# Patient Record
Sex: MALE | Race: WHITE | Employment: PART TIME | URBAN - METROPOLITAN AREA
[De-identification: names, ages, dates, MRNs, and addresses within clinical notes are randomized per-mention and may not be internally consistent; named-entity substitution may affect disease eponyms.]

---

## 2020-03-26 ENCOUNTER — HOSPITAL ENCOUNTER (INPATIENT)
Age: 55
LOS: 6 days | Discharge: HOME | DRG: 881 | End: 2020-04-01
Attending: EMERGENCY MEDICINE | Admitting: PSYCHIATRY & NEUROLOGY
Payer: OTHER GOVERNMENT

## 2020-03-26 PROBLEM — F32.A DEPRESSION WITH SUICIDAL IDEATION: Status: ACTIVE | Noted: 2020-03-26

## 2020-03-26 PROBLEM — R45.851 DEPRESSION WITH SUICIDAL IDEATION: Status: ACTIVE | Noted: 2020-03-26

## 2020-03-26 LAB
ACETAMINOPHEN LEVEL: <5 MCG/ML (ref 10–30)
ALBUMIN SERPL-MCNC: 4.3 G/DL (ref 3.5–5.2)
ALP BLD-CCNC: 109 U/L (ref 40–129)
ALT SERPL-CCNC: 28 U/L (ref 0–40)
AMPHETAMINE SCREEN, URINE: POSITIVE
ANION GAP SERPL CALCULATED.3IONS-SCNC: 21 MMOL/L (ref 7–16)
AST SERPL-CCNC: 37 U/L (ref 0–39)
BARBITURATE SCREEN URINE: POSITIVE
BASOPHILS ABSOLUTE: 0.07 E9/L (ref 0–0.2)
BASOPHILS RELATIVE PERCENT: 1.1 % (ref 0–2)
BENZODIAZEPINE SCREEN, URINE: NOT DETECTED
BILIRUB SERPL-MCNC: 0.5 MG/DL (ref 0–1.2)
BUN BLDV-MCNC: 8 MG/DL (ref 6–20)
CALCIUM SERPL-MCNC: 8.6 MG/DL (ref 8.6–10.2)
CANNABINOID SCREEN URINE: NOT DETECTED
CHLORIDE BLD-SCNC: 101 MMOL/L (ref 98–107)
CO2: 20 MMOL/L (ref 22–29)
COCAINE METABOLITE SCREEN URINE: NOT DETECTED
CREAT SERPL-MCNC: 0.8 MG/DL (ref 0.7–1.2)
EOSINOPHILS ABSOLUTE: 0.15 E9/L (ref 0.05–0.5)
EOSINOPHILS RELATIVE PERCENT: 2.3 % (ref 0–6)
ETHANOL: 205 MG/DL (ref 0–0.08)
ETHANOL: 57 MG/DL (ref 0–0.08)
ETHANOL: <10 MG/DL (ref 0–0.08)
FENTANYL SCREEN, URINE: NOT DETECTED
GFR AFRICAN AMERICAN: >60
GFR NON-AFRICAN AMERICAN: >60 ML/MIN/1.73
GLUCOSE BLD-MCNC: 111 MG/DL (ref 74–99)
HCT VFR BLD CALC: 42.4 % (ref 37–54)
HEMOGLOBIN: 14.9 G/DL (ref 12.5–16.5)
IMMATURE GRANULOCYTES #: 0.01 E9/L
IMMATURE GRANULOCYTES %: 0.2 % (ref 0–5)
LYMPHOCYTES ABSOLUTE: 1.39 E9/L (ref 1.5–4)
LYMPHOCYTES RELATIVE PERCENT: 21.2 % (ref 20–42)
Lab: ABNORMAL
MCH RBC QN AUTO: 32.5 PG (ref 26–35)
MCHC RBC AUTO-ENTMCNC: 35.1 % (ref 32–34.5)
MCV RBC AUTO: 92.4 FL (ref 80–99.9)
METHADONE SCREEN, URINE: NOT DETECTED
MONOCYTES ABSOLUTE: 0.86 E9/L (ref 0.1–0.95)
MONOCYTES RELATIVE PERCENT: 13.1 % (ref 2–12)
NEUTROPHILS ABSOLUTE: 4.07 E9/L (ref 1.8–7.3)
NEUTROPHILS RELATIVE PERCENT: 62.1 % (ref 43–80)
OPIATE SCREEN URINE: NOT DETECTED
OXYCODONE URINE: NOT DETECTED
PDW BLD-RTO: 12.8 FL (ref 11.5–15)
PHENCYCLIDINE SCREEN URINE: NOT DETECTED
PLATELET # BLD: 123 E9/L (ref 130–450)
PMV BLD AUTO: 7.6 FL (ref 7–12)
POTASSIUM SERPL-SCNC: 3.7 MMOL/L (ref 3.5–5)
RBC # BLD: 4.59 E12/L (ref 3.8–5.8)
SALICYLATE, SERUM: <0.3 MG/DL (ref 0–30)
SODIUM BLD-SCNC: 142 MMOL/L (ref 132–146)
TOTAL PROTEIN: 7.7 G/DL (ref 6.4–8.3)
TRICYCLIC ANTIDEPRESSANTS SCREEN SERUM: NEGATIVE NG/ML
WBC # BLD: 6.6 E9/L (ref 4.5–11.5)

## 2020-03-26 PROCEDURE — G0480 DRUG TEST DEF 1-7 CLASSES: HCPCS

## 2020-03-26 PROCEDURE — 1240000000 HC EMOTIONAL WELLNESS R&B

## 2020-03-26 PROCEDURE — 6370000000 HC RX 637 (ALT 250 FOR IP): Performed by: PSYCHIATRY & NEUROLOGY

## 2020-03-26 PROCEDURE — 6370000000 HC RX 637 (ALT 250 FOR IP): Performed by: EMERGENCY MEDICINE

## 2020-03-26 PROCEDURE — 85025 COMPLETE CBC W/AUTO DIFF WBC: CPT

## 2020-03-26 PROCEDURE — 36415 COLL VENOUS BLD VENIPUNCTURE: CPT

## 2020-03-26 PROCEDURE — 93005 ELECTROCARDIOGRAM TRACING: CPT | Performed by: EMERGENCY MEDICINE

## 2020-03-26 PROCEDURE — 80307 DRUG TEST PRSMV CHEM ANLYZR: CPT

## 2020-03-26 PROCEDURE — 99285 EMERGENCY DEPT VISIT HI MDM: CPT

## 2020-03-26 PROCEDURE — 80053 COMPREHEN METABOLIC PANEL: CPT

## 2020-03-26 RX ORDER — LISINOPRIL 10 MG/1
40 TABLET ORAL ONCE
Status: COMPLETED | OUTPATIENT
Start: 2020-03-26 | End: 2020-03-26

## 2020-03-26 RX ORDER — DIVALPROEX SODIUM 250 MG/1
250 TABLET, DELAYED RELEASE ORAL 2 TIMES DAILY
Status: DISCONTINUED | OUTPATIENT
Start: 2020-03-26 | End: 2020-04-01 | Stop reason: HOSPADM

## 2020-03-26 RX ORDER — HALOPERIDOL 5 MG
5 TABLET ORAL EVERY 6 HOURS PRN
Status: DISCONTINUED | OUTPATIENT
Start: 2020-03-26 | End: 2020-04-01 | Stop reason: HOSPADM

## 2020-03-26 RX ORDER — THIAMINE MONONITRATE (VIT B1) 100 MG
100 TABLET ORAL DAILY
Status: DISCONTINUED | OUTPATIENT
Start: 2020-03-27 | End: 2020-03-26 | Stop reason: SDUPTHER

## 2020-03-26 RX ORDER — FOLIC ACID 1 MG/1
1 TABLET ORAL DAILY
Status: DISCONTINUED | OUTPATIENT
Start: 2020-03-27 | End: 2020-03-26 | Stop reason: SDUPTHER

## 2020-03-26 RX ORDER — THIAMINE MONONITRATE (VIT B1) 100 MG
100 TABLET ORAL DAILY
Status: DISCONTINUED | OUTPATIENT
Start: 2020-03-27 | End: 2020-04-01 | Stop reason: HOSPADM

## 2020-03-26 RX ORDER — DIVALPROEX SODIUM 250 MG/1
250 TABLET, DELAYED RELEASE ORAL 2 TIMES DAILY
Status: DISCONTINUED | OUTPATIENT
Start: 2020-03-26 | End: 2020-03-26 | Stop reason: SDUPTHER

## 2020-03-26 RX ORDER — TRAZODONE HYDROCHLORIDE 50 MG/1
50 TABLET ORAL NIGHTLY PRN
Status: DISCONTINUED | OUTPATIENT
Start: 2020-03-26 | End: 2020-04-01 | Stop reason: HOSPADM

## 2020-03-26 RX ORDER — MAGNESIUM HYDROXIDE/ALUMINUM HYDROXICE/SIMETHICONE 120; 1200; 1200 MG/30ML; MG/30ML; MG/30ML
30 SUSPENSION ORAL PRN
Status: DISCONTINUED | OUTPATIENT
Start: 2020-03-26 | End: 2020-04-01 | Stop reason: HOSPADM

## 2020-03-26 RX ORDER — CHLORDIAZEPOXIDE HYDROCHLORIDE 25 MG/1
25 CAPSULE, GELATIN COATED ORAL
Status: DISCONTINUED | OUTPATIENT
Start: 2020-03-26 | End: 2020-03-29

## 2020-03-26 RX ORDER — ACETAMINOPHEN 325 MG/1
650 TABLET ORAL EVERY 6 HOURS PRN
Status: DISCONTINUED | OUTPATIENT
Start: 2020-03-26 | End: 2020-04-01 | Stop reason: HOSPADM

## 2020-03-26 RX ORDER — CHLORDIAZEPOXIDE HYDROCHLORIDE 25 MG/1
25 CAPSULE, GELATIN COATED ORAL
Status: DISCONTINUED | OUTPATIENT
Start: 2020-03-26 | End: 2020-03-26 | Stop reason: SDUPTHER

## 2020-03-26 RX ORDER — HYDROXYZINE PAMOATE 25 MG/1
50 CAPSULE ORAL 3 TIMES DAILY PRN
Status: DISCONTINUED | OUTPATIENT
Start: 2020-03-26 | End: 2020-04-01 | Stop reason: HOSPADM

## 2020-03-26 RX ORDER — HALOPERIDOL 5 MG/ML
5 INJECTION INTRAMUSCULAR EVERY 6 HOURS PRN
Status: DISCONTINUED | OUTPATIENT
Start: 2020-03-26 | End: 2020-04-01 | Stop reason: HOSPADM

## 2020-03-26 RX ORDER — FOLIC ACID 1 MG/1
1 TABLET ORAL DAILY
Status: DISCONTINUED | OUTPATIENT
Start: 2020-03-27 | End: 2020-04-01 | Stop reason: HOSPADM

## 2020-03-26 RX ORDER — BUPROPION HYDROCHLORIDE 75 MG/1
150 TABLET ORAL DAILY
Status: ON HOLD | COMMUNITY
End: 2020-04-01 | Stop reason: HOSPADM

## 2020-03-26 RX ORDER — NICOTINE 21 MG/24HR
1 PATCH, TRANSDERMAL 24 HOURS TRANSDERMAL ONCE
Status: COMPLETED | OUTPATIENT
Start: 2020-03-26 | End: 2020-03-28

## 2020-03-26 RX ADMIN — CHLORDIAZEPOXIDE HYDROCHLORIDE 25 MG: 25 CAPSULE ORAL at 22:21

## 2020-03-26 RX ADMIN — LISINOPRIL 40 MG: 10 TABLET ORAL at 19:13

## 2020-03-26 RX ADMIN — DIVALPROEX SODIUM 250 MG: 250 TABLET, DELAYED RELEASE ORAL at 22:21

## 2020-03-26 ASSESSMENT — PAIN DESCRIPTION - ORIENTATION: ORIENTATION: LOWER

## 2020-03-26 ASSESSMENT — PAIN DESCRIPTION - LOCATION: LOCATION: BACK

## 2020-03-26 ASSESSMENT — SLEEP AND FATIGUE QUESTIONNAIRES
RESTFUL SLEEP: NO
SLEEP PATTERN: INSOMNIA
DIFFICULTY FALLING ASLEEP: YES
AVERAGE NUMBER OF SLEEP HOURS: 4
DIFFICULTY ARISING: YES
DO YOU USE A SLEEP AID: NO
DIFFICULTY STAYING ASLEEP: YES
DO YOU HAVE DIFFICULTY SLEEPING: YES

## 2020-03-26 ASSESSMENT — PATIENT HEALTH QUESTIONNAIRE - PHQ9: SUM OF ALL RESPONSES TO PHQ QUESTIONS 1-9: 13

## 2020-03-26 ASSESSMENT — PAIN SCALES - GENERAL
PAINLEVEL_OUTOF10: 0
PAINLEVEL_OUTOF10: 6

## 2020-03-26 ASSESSMENT — PAIN DESCRIPTION - DESCRIPTORS: DESCRIPTORS: ACHING

## 2020-03-26 ASSESSMENT — LIFESTYLE VARIABLES: HISTORY_ALCOHOL_USE: YES

## 2020-03-26 ASSESSMENT — PAIN DESCRIPTION - PAIN TYPE: TYPE: CHRONIC PAIN

## 2020-03-26 NOTE — ED PROVIDER NOTES
tender to palpation in the midline, no stridor, no crepitus, no meningeal signs  Respiratory: Lungs clear to auscultation bilaterally, no wheezes, rales, or rhonchi. Not in respiratory distress  Cardiovascular:  Regular rate. Regular rhythm. No murmurs, gallops, or rubs. 2+ distal pulses  Chest: No chest wall tenderness  GI:  Abdomen Soft, Non tender, Non distended. +BS. No organomegaly, no palpable masses,  No rebound, guarding, or rigidity. Musculoskeletal: Moves all extremities x 4. Warm and well perfused, no clubbing, cyanosis, or edema. Capillary refill <3 seconds  Integument: skin warm and dry. No rashes. Lymphatic: no lymphadenopathy noted  Neurologic: GCS 15, no focal deficits, symmetric strength 5/5 in the upper and lower extremities bilaterally  Psychiatric: Normal Affect    -------------------------------------------------- RESULTS -------------------------------------------------  I have personally reviewed all laboratory and imaging results for this patient. Results are listed below.      LABS:  Results for orders placed or performed during the hospital encounter of 03/26/20   CBC Auto Differential   Result Value Ref Range    WBC 6.6 4.5 - 11.5 E9/L    RBC 4.59 3.80 - 5.80 E12/L    Hemoglobin 14.9 12.5 - 16.5 g/dL    Hematocrit 42.4 37.0 - 54.0 %    MCV 92.4 80.0 - 99.9 fL    MCH 32.5 26.0 - 35.0 pg    MCHC 35.1 (H) 32.0 - 34.5 %    RDW 12.8 11.5 - 15.0 fL    Platelets 455 (L) 850 - 450 E9/L    MPV 7.6 7.0 - 12.0 fL    Neutrophils % 62.1 43.0 - 80.0 %    Immature Granulocytes % 0.2 0.0 - 5.0 %    Lymphocytes % 21.2 20.0 - 42.0 %    Monocytes % 13.1 (H) 2.0 - 12.0 %    Eosinophils % 2.3 0.0 - 6.0 %    Basophils % 1.1 0.0 - 2.0 %    Neutrophils Absolute 4.07 1.80 - 7.30 E9/L    Immature Granulocytes # 0.01 E9/L    Lymphocytes Absolute 1.39 (L) 1.50 - 4.00 E9/L    Monocytes Absolute 0.86 0.10 - 0.95 E9/L    Eosinophils Absolute 0.15 0.05 - 0.50 E9/L    Basophils Absolute 0.07 0.00 - 0.20 E9/L Comprehensive Metabolic Panel   Result Value Ref Range    Sodium 142 132 - 146 mmol/L    Potassium 3.7 3.5 - 5.0 mmol/L    Chloride 101 98 - 107 mmol/L    CO2 20 (L) 22 - 29 mmol/L    Anion Gap 21 (H) 7 - 16 mmol/L    Glucose 111 (H) 74 - 99 mg/dL    BUN 8 6 - 20 mg/dL    CREATININE 0.8 0.7 - 1.2 mg/dL    GFR Non-African American >60 >=60 mL/min/1.73    GFR African American >60     Calcium 8.6 8.6 - 10.2 mg/dL    Total Protein 7.7 6.4 - 8.3 g/dL    Alb 4.3 3.5 - 5.2 g/dL    Total Bilirubin 0.5 0.0 - 1.2 mg/dL    Alkaline Phosphatase 109 40 - 129 U/L    ALT 28 0 - 40 U/L    AST 37 0 - 39 U/L   Serum Drug Screen   Result Value Ref Range    Ethanol Lvl 205 mg/dL    Acetaminophen Level <5.0 (L) 10.0 - 00.6 mcg/mL    Salicylate, Serum <4.4 0.0 - 30.0 mg/dL    TCA Scrn NEGATIVE Cutoff:300 ng/mL   Urine Drug Screen   Result Value Ref Range    Amphetamine Screen, Urine POSITIVE (A) Negative <1000 ng/mL    Barbiturate Screen, Ur POSITIVE (A) Negative < 200 ng/mL    Benzodiazepine Screen, Urine NOT DETECTED Negative < 200 ng/mL    Cannabinoid Scrn, Ur NOT DETECTED Negative < 50ng/mL    Cocaine Metabolite Screen, Urine NOT DETECTED Negative < 300 ng/mL    Opiate Scrn, Ur NOT DETECTED Negative < 300ng/mL    PCP Screen, Urine NOT DETECTED Negative < 25 ng/mL    Methadone Screen, Urine NOT DETECTED Negative <300 ng/mL    Oxycodone Urine NOT DETECTED Negative <100 ng/mL    FENTANYL SCREEN, URINE NOT DETECTED Negative <1 ng/mL    Drug Screen Comment: see below        RADIOLOGY:  Interpreted by Radiologist.  No orders to display       EKG: This EKG is signed and interpreted by Dr. Gutierrez Lira. Time: 1057  Rate: 115  Rhythm: Sinus  Interpretation: sinus tachycardia  Comparison: no previous EKG      ------------------------- NURSING NOTES AND VITALS REVIEWED ---------------------------   The nursing notes within the ED encounter and vital signs as below have been reviewed by myself.   /89   Pulse 118   Temp 98.6 °F (37 °C) (Oral)   Resp 18   Ht 5' 8\" (1.727 m)   Wt 220 lb (99.8 kg)   SpO2 96%   BMI 33.45 kg/m²   Oxygen Saturation Interpretation: Normal    The patients available past medical records and past encounters were reviewed. ------------------------------ ED COURSE/MEDICAL DECISION MAKING----------------------  Medications   nicotine (NICODERM CQ) 21 MG/24HR 1 patch (has no administration in time range)         Medical Decision Makinyear old male presenting with suicidal ideation with plan for the last week. CBC and CMP unremarkable, UDS positive for amphetamine and barbiturates, ethanol level 205 and serum drug screen. Repeat ethanol <100. Patient is medically clear for psychiatric disposition/ admission. This patient's ED course included: a personal history and physicial examination and re-evaluation prior to disposition    This patient has remained hemodynamically stable during their ED course. Re-Evaluations:             Re-evaluation. Patients symptoms show no change    Re-examination  3/26/20   1220          Vital Signs:   Vitals:    20 1019 20 1254   BP: (!) 170/117 138/89   Pulse: 124 118   Resp: 18 18   Temp: 98.6 °F (37 °C)    TempSrc: Oral    SpO2: 96% 96%   Weight: 220 lb (99.8 kg)    Height: 5' 8\" (1.727 m)      Card/Pulm:  Rhythm: normal rate. Heart Sounds: no murmurs, gallops, or rubs. clear to auscultation, no wheezes or rales and unlabored breathing. Capillary Refill: normal.  Radial Pulse:  present 2+. Skin:  Dry and Warm. Consultations:             psych      Critical Care: no      Counseling: The emergency provider has spoken with the patient and discussed todays results, in addition to providing specific details for the plan of care and counseling regarding the diagnosis and prognosis.   Questions are answered at this time and they are agreeable with the plan.       --------------------------------- IMPRESSION AND DISPOSITION ---------------------------------    IMPRESSION  1. Suicidal ideation        DISPOSITION  Disposition: per psych  Patient condition is fair    NOTE: This report was transcribed using voice recognition software.  Every effort was made to ensure accuracy; however, inadvertent computerized transcription errors may be present       Johanny Perez MD  03/26/20 2720

## 2020-03-26 NOTE — ED NOTES
EVA placed phone call to [facility] [phone] S [phone] and spoke with Rep. Chanel. Initiated referral for inpatient psych. Per Darío, no beds on psych available today. EVA faxed clinical information to [phone].      JO Boyd, [place]  03/26/20 3006

## 2020-03-27 LAB
EKG ATRIAL RATE: 115 BPM
EKG P AXIS: 51 DEGREES
EKG P-R INTERVAL: 130 MS
EKG Q-T INTERVAL: 312 MS
EKG QRS DURATION: 78 MS
EKG QTC CALCULATION (BAZETT): 431 MS
EKG R AXIS: -15 DEGREES
EKG T AXIS: 18 DEGREES
EKG VENTRICULAR RATE: 115 BPM

## 2020-03-27 PROCEDURE — 6370000000 HC RX 637 (ALT 250 FOR IP): Performed by: EMERGENCY MEDICINE

## 2020-03-27 PROCEDURE — 6370000000 HC RX 637 (ALT 250 FOR IP): Performed by: PSYCHIATRY & NEUROLOGY

## 2020-03-27 PROCEDURE — 93010 ELECTROCARDIOGRAM REPORT: CPT | Performed by: INTERNAL MEDICINE

## 2020-03-27 PROCEDURE — 1240000000 HC EMOTIONAL WELLNESS R&B

## 2020-03-27 PROCEDURE — 99222 1ST HOSP IP/OBS MODERATE 55: CPT | Performed by: NURSE PRACTITIONER

## 2020-03-27 RX ORDER — MIRTAZAPINE 15 MG/1
15 TABLET, FILM COATED ORAL NIGHTLY
Status: ON HOLD | COMMUNITY
End: 2020-04-01 | Stop reason: HOSPADM

## 2020-03-27 RX ORDER — MIRTAZAPINE 15 MG/1
15 TABLET, FILM COATED ORAL NIGHTLY
Status: DISCONTINUED | OUTPATIENT
Start: 2020-03-27 | End: 2020-04-01 | Stop reason: HOSPADM

## 2020-03-27 RX ORDER — ATENOLOL 25 MG/1
25 TABLET ORAL DAILY
COMMUNITY

## 2020-03-27 RX ORDER — SILDENAFIL 50 MG/1
50 TABLET, FILM COATED ORAL PRN
COMMUNITY

## 2020-03-27 RX ORDER — LISINOPRIL 20 MG/1
20 TABLET ORAL DAILY
Status: DISCONTINUED | OUTPATIENT
Start: 2020-03-27 | End: 2020-04-01 | Stop reason: HOSPADM

## 2020-03-27 RX ORDER — BUPROPION HYDROCHLORIDE 150 MG/1
150 TABLET ORAL DAILY
Status: DISCONTINUED | OUTPATIENT
Start: 2020-03-28 | End: 2020-04-01 | Stop reason: HOSPADM

## 2020-03-27 RX ORDER — LIDOCAINE 40 MG/G
CREAM TOPICAL 2 TIMES DAILY PRN
COMMUNITY

## 2020-03-27 RX ORDER — TOPIRAMATE 100 MG/1
100 TABLET, FILM COATED ORAL 2 TIMES DAILY
Status: DISCONTINUED | OUTPATIENT
Start: 2020-03-27 | End: 2020-04-01 | Stop reason: HOSPADM

## 2020-03-27 RX ORDER — PANTOPRAZOLE SODIUM 40 MG/1
40 TABLET, DELAYED RELEASE ORAL DAILY
Status: DISCONTINUED | OUTPATIENT
Start: 2020-03-27 | End: 2020-04-01 | Stop reason: HOSPADM

## 2020-03-27 RX ORDER — ALBUTEROL SULFATE 90 UG/1
2 AEROSOL, METERED RESPIRATORY (INHALATION) EVERY 6 HOURS PRN
COMMUNITY

## 2020-03-27 RX ADMIN — CHLORDIAZEPOXIDE HYDROCHLORIDE 25 MG: 25 CAPSULE ORAL at 16:30

## 2020-03-27 RX ADMIN — CHLORDIAZEPOXIDE HYDROCHLORIDE 25 MG: 25 CAPSULE ORAL at 06:32

## 2020-03-27 RX ADMIN — FOLIC ACID 1 MG: 1 TABLET ORAL at 09:32

## 2020-03-27 RX ADMIN — CHLORDIAZEPOXIDE HYDROCHLORIDE 25 MG: 25 CAPSULE ORAL at 13:15

## 2020-03-27 RX ADMIN — DIVALPROEX SODIUM 250 MG: 250 TABLET, DELAYED RELEASE ORAL at 20:37

## 2020-03-27 RX ADMIN — DIVALPROEX SODIUM 250 MG: 250 TABLET, DELAYED RELEASE ORAL at 09:32

## 2020-03-27 RX ADMIN — TOPIRAMATE 100 MG: 100 TABLET, FILM COATED ORAL at 20:53

## 2020-03-27 RX ADMIN — CHLORDIAZEPOXIDE HYDROCHLORIDE 25 MG: 25 CAPSULE ORAL at 18:49

## 2020-03-27 RX ADMIN — TRAZODONE HYDROCHLORIDE 50 MG: 50 TABLET ORAL at 20:37

## 2020-03-27 RX ADMIN — CHLORDIAZEPOXIDE HYDROCHLORIDE 25 MG: 25 CAPSULE ORAL at 10:01

## 2020-03-27 RX ADMIN — MIRTAZAPINE 15 MG: 15 TABLET, FILM COATED ORAL at 20:53

## 2020-03-27 RX ADMIN — CHLORDIAZEPOXIDE HYDROCHLORIDE 25 MG: 25 CAPSULE ORAL at 22:23

## 2020-03-27 RX ADMIN — Medication 100 MG: at 09:32

## 2020-03-27 ASSESSMENT — PAIN SCALES - GENERAL: PAINLEVEL_OUTOF10: 0

## 2020-03-27 NOTE — PROGRESS NOTES
Message left at [address] , awaiting release of information form to be faxed back . BENTLEY signed by patient faxed, Fax number [phone] for continuation of care of patient and to verify patient meds.  BENTLEY in patient soft chart

## 2020-03-27 NOTE — PLAN OF CARE
Problem: Suicide risk  Goal: Provide patient with safe environment  Description: Provide patient with safe environment  Outcome: Ongoing     Problem: Altered Mood, Depressive Behavior:  Goal: Able to verbalize and/or display a decrease in depressive symptoms  Description: Able to verbalize and/or display a decrease in depressive symptoms  Outcome: Ongoing  Goal: Absence of self-harm  Description: Absence of self-harm  Outcome: Ongoing

## 2020-03-27 NOTE — H&P
and he feels upset about this. He states he lives with his father helps take care of him. He states his drug screen is positive for barbiturates and amphetamines because they were \"around. \"  He states a friend gave him the Adderall and he took it so he could stay up late and continued drinking. He is tearful he reports significant neurovegetative symptoms of depression he feels hopeless and worthless and guilty. He states he drinks every day since he was 12years old. He states he is never gone to rehab and states he has not wanted go to inpatient rehab due to taking care of his father. Past psychiatric history: Patient denies ever being inpatient psychiatrically hospitalized. He follows outpatient with nurse practitioner Rufina at the [place] and is prescribed Wellbutrin and Remeron. He states he is being treated for depression for the last 7 to 8 years. He denies ever attempting suicide. He states that cousin committed suicide sometime in the [de-identified]. Legal history: Patient reports having been arrested 4 times in the past for DUIs he is also been to senior living he is not currently on probation or parole    Substance abuse history: Patient reports drinking every day since he was approximate 12years old. He has had multiple DUIs has never gone to rehab in the past.  He is also positive for barbiturates and Adderall and states that her medications that he had \"lying around. \"  He states he took the Adderall that was given to him by a friend's that he could \"stay up late and drink more. \"    Personal family and social history: Patient states      Past Medical History:        Diagnosis Date    Back pain, chronic     Depression     Hypertension        Medications Prior to Admission:   Medications Prior to Admission: buPROPion (WELLBUTRIN) 75 MG tablet, Take 75 mg by mouth daily  fluticasone (VERAMYST) 27.5 MCG/SPRAY nasal spray, 2 sprays by Each Nostril route daily  lisinopril (PRINIVIL;ZESTRIL) 40 MG tablet, Take 40 mg by mouth daily. chlorpheniramine (CHLOR-TRIMETON) 4 MG tablet, Take 4 mg by mouth 2 times daily as needed. omeprazole (PRILOSEC) 20 MG capsule, Take 20 mg by mouth daily. topiramate (TOPAMAX) 100 MG tablet, Take 400 mg by mouth 2 times daily. Past Surgical History:    No past surgical history on file. Allergies:   Pcn [penicillins] and Sulfa antibiotics    Family History  No family history on file. EXAMINATION:    REVIEW OF SYSTEMS:    ROS:  [x] All negative/unchanged except if checked.  Explain positive(checked items) below:  [] Constitutional  [] Eyes  [] Ear/Nose/Mouth/Throat  [] Respiratory  [] CV  [] GI  []   [] Musculoskeletal  [] Skin/Breast  [] Neurological  [] Endocrine  [] Heme/Lymph  [] Allergic/Immunologic    Explanation:     Vitals:  BP (!) 186/88   Pulse 115   Temp 97.2 °F (36.2 °C) (Oral)   Resp 18   Ht 5' 8\" (1.727 m)   Wt 223 lb (101.2 kg)   SpO2 96%   BMI 33.91 kg/m²      Physical Examination:   Head: x  Atraumatic: x normocephalic  Skin and Mucosa        Moist x  Dry   Pale  x Normal   Neck:  Thyroid  Palpable   x  Not palpable   venus distention   adenopathy   Chest: x Clear   Rhonchi     Wheezing   CV:  xS1   xS2    xNo murmer   Abdomen:  x  Soft    Tender    Viceromegaly   Extremities:  x No Edema     Edema     Cranial Nerves Examination:   CN II:   xPupils are reactive to light  Pupils are non reactive to light  CN III, IV, VI:  xNo eye deviation    No diplopia or ptosis   CN V:    xFacial Sensation is intact     Facial Sensation is not intact   CN IIIV:   x Hearing is normal to rubbing fingers   CN IX, X:     xNormal gag reflex and phonation   CN XI:   xShoulder shrug and neck rotation is normal  CNXII:    xTongue is midline no deviation or atrophy    Mental Status Examination:    Level of consciousness:  within normal limits   Appearance:  well-appearing  Behavior/Motor:  no abnormalities noted  Attitude toward examiner:  cooperative  Speech:  spontaneous, normal

## 2020-03-28 PROCEDURE — 6370000000 HC RX 637 (ALT 250 FOR IP): Performed by: PSYCHIATRY & NEUROLOGY

## 2020-03-28 PROCEDURE — 99232 SBSQ HOSP IP/OBS MODERATE 35: CPT | Performed by: NURSE PRACTITIONER

## 2020-03-28 PROCEDURE — 1240000000 HC EMOTIONAL WELLNESS R&B

## 2020-03-28 PROCEDURE — 6370000000 HC RX 637 (ALT 250 FOR IP): Performed by: NURSE PRACTITIONER

## 2020-03-28 RX ORDER — POLYETHYLENE GLYCOL 3350 17 G
4 POWDER IN PACKET (EA) ORAL PRN
Status: DISCONTINUED | OUTPATIENT
Start: 2020-03-28 | End: 2020-03-28

## 2020-03-28 RX ORDER — POLYETHYLENE GLYCOL 3350 17 G
2 POWDER IN PACKET (EA) ORAL PRN
Status: DISCONTINUED | OUTPATIENT
Start: 2020-03-28 | End: 2020-03-28

## 2020-03-28 RX ORDER — NICOTINE 21 MG/24HR
1 PATCH, TRANSDERMAL 24 HOURS TRANSDERMAL DAILY
Status: DISCONTINUED | OUTPATIENT
Start: 2020-03-29 | End: 2020-04-01 | Stop reason: HOSPADM

## 2020-03-28 RX ORDER — POLYETHYLENE GLYCOL 3350 17 G
4 POWDER IN PACKET (EA) ORAL PRN
Status: DISCONTINUED | OUTPATIENT
Start: 2020-03-28 | End: 2020-04-01 | Stop reason: HOSPADM

## 2020-03-28 RX ADMIN — FOLIC ACID 1 MG: 1 TABLET ORAL at 08:24

## 2020-03-28 RX ADMIN — CHLORDIAZEPOXIDE HYDROCHLORIDE 25 MG: 25 CAPSULE ORAL at 18:42

## 2020-03-28 RX ADMIN — LISINOPRIL 20 MG: 20 TABLET ORAL at 08:24

## 2020-03-28 RX ADMIN — MIRTAZAPINE 15 MG: 15 TABLET, FILM COATED ORAL at 20:54

## 2020-03-28 RX ADMIN — PANTOPRAZOLE SODIUM 40 MG: 40 TABLET, DELAYED RELEASE ORAL at 08:24

## 2020-03-28 RX ADMIN — DIVALPROEX SODIUM 250 MG: 250 TABLET, DELAYED RELEASE ORAL at 08:24

## 2020-03-28 RX ADMIN — BUPROPION HYDROCHLORIDE 150 MG: 150 TABLET, EXTENDED RELEASE ORAL at 08:24

## 2020-03-28 RX ADMIN — Medication 100 MG: at 08:24

## 2020-03-28 RX ADMIN — TOPIRAMATE 100 MG: 100 TABLET, FILM COATED ORAL at 20:54

## 2020-03-28 RX ADMIN — CHLORDIAZEPOXIDE HYDROCHLORIDE 25 MG: 25 CAPSULE ORAL at 06:34

## 2020-03-28 RX ADMIN — CHLORDIAZEPOXIDE HYDROCHLORIDE 25 MG: 25 CAPSULE ORAL at 09:00

## 2020-03-28 RX ADMIN — NICOTINE POLACRILEX 4 MG: 2 LOZENGE ORAL at 21:39

## 2020-03-28 RX ADMIN — TRAZODONE HYDROCHLORIDE 50 MG: 50 TABLET ORAL at 20:54

## 2020-03-28 RX ADMIN — CHLORDIAZEPOXIDE HYDROCHLORIDE 25 MG: 25 CAPSULE ORAL at 12:53

## 2020-03-28 RX ADMIN — DIVALPROEX SODIUM 250 MG: 250 TABLET, DELAYED RELEASE ORAL at 20:54

## 2020-03-28 RX ADMIN — CHLORDIAZEPOXIDE HYDROCHLORIDE 25 MG: 25 CAPSULE ORAL at 23:26

## 2020-03-28 RX ADMIN — TOPIRAMATE 100 MG: 100 TABLET, FILM COATED ORAL at 08:24

## 2020-03-28 ASSESSMENT — PAIN SCALES - GENERAL
PAINLEVEL_OUTOF10: 0

## 2020-03-28 NOTE — PLAN OF CARE
Problem: Suicide risk  Goal: Provide patient with safe environment  Description: Provide patient with safe environment  3/27/2020 2341 by Keshav Hinojosa RN  Outcome: Met This Shift     Problem: Altered Mood, Depressive Behavior:  Goal: Able to verbalize and/or display a decrease in depressive symptoms  Description: Able to verbalize and/or display a decrease in depressive symptoms  3/27/2020 2341 by Keshav Hinojosa RN  Outcome: Met This Shift     Problem: Altered Mood, Depressive Behavior:  Goal: Absence of self-harm  Description: Absence of self-harm  3/27/2020 2341 by Keshav Hinojosa RN  Outcome: Met This Shift
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
## 2020-03-28 NOTE — PROGRESS NOTES
BEHAVIORAL HEALTH FOLLOW-UP NOTE     3/28/2020     Patient was seen and examined in person, Chart reviewed   Patient's case discussed with staff/team    Chief Complaint: I would like to go to [facility] through the [facility]    Interim History: The patient denies suicidal homicidal ideation. Patient denies audible and visual hallucinations. Patient is taking his medications and going to groups. The patient's recent CWA was a 4. Patient states that he does not have any significant signs or symptoms of alcohol withdrawal.  Patient rates his depression as a 6 which is an improvement. The patient states he is not anxious. Patient is calm and cooperative on the unit      Appetite:  [x] Normal/Unchanged  [] Increased  [] Decreased      Sleep:       [x] Normal/Unchanged  [] Fair       [] Poor              Energy:    [x] Normal/Unchanged  [] Increased  [] Decreased        SI [] Present  [x] Absent    HI  []Present  [x] Absent     Aggression:  [] yes  [x] no    Patient is [x] able  [] unable to CONTRACT FOR SAFETY     PAST MEDICAL/PSYCHIATRIC HISTORY:   Past Medical History:   Diagnosis Date    Back pain, chronic     Depression     Hypertension        FAMILY/SOCIAL HISTORY:  No family history on file.   Social History     Socioeconomic History    Marital status:      Spouse name: Not on file    Number of children: Not on file    Years of education: Not on file    Highest education level: Not on file   Occupational History    Not on file   Social Needs    Financial resource strain: Not on file    Food insecurity     Worry: Not on file     Inability: Not on file    Transportation needs     Medical: Not on file     Non-medical: Not on file   Tobacco Use    Smoking status: Current Every Day Smoker    Smokeless tobacco: Current User     Types: Snuff   Substance and Sexual Activity    Alcohol use: No    Drug use: No    Sexual activity: Not on file   Lifestyle    Physical activity     Days per week: Not on file Minutes per session: Not on file    Stress: Not on file   Relationships    Social connections     Talks on phone: Not on file     Gets together: Not on file     Attends [employer] service: Not on file     Active member of club or organization: Not on file     Attends meetings of clubs or organizations: Not on file     Relationship status: Not on file    Intimate partner violence     Fear of current or ex partner: Not on file     Emotionally abused: Not on file     Physically abused: Not on file     Forced sexual activity: Not on file   Other Topics Concern    Not on file   Social History Narrative    Not on file           ROS:  [x] All negative/unchanged except if checked.  Explain positive(checked items) below:  [] Constitutional  [] Eyes  [] Ear/Nose/Mouth/Throat  [] Respiratory  [] CV  [] GI  []   [] Musculoskeletal  [] Skin/Breast  [] Neurological  [] Endocrine  [] Heme/Lymph  [] Allergic/Immunologic    Explanation:     MEDICATIONS:    Current Facility-Administered Medications:     mirtazapine (REMERON) tablet 15 mg, 15 mg, Oral, Nightly, Magali Leyva MD, 15 mg at 03/27/20 2053    pantoprazole (PROTONIX) tablet 40 mg, 40 mg, Oral, Daily, Magali Leyva MD, 40 mg at 03/28/20 0824    topiramate (TOPAMAX) tablet 100 mg, 100 mg, Oral, BID, Magali Leyva MD, 100 mg at 03/28/20 0824    lisinopril (PRINIVIL;ZESTRIL) tablet 20 mg, 20 mg, Oral, Daily, Magali Leyva MD, 20 mg at 03/28/20 0824    buPROPion (WELLBUTRIN XL) extended release tablet 150 mg, 150 mg, Oral, Daily, Magali Leyva MD, 150 mg at 03/28/20 0824    acetaminophen (TYLENOL) tablet 650 mg, 650 mg, Oral, Q6H PRN, Davina Matson MD    hydrOXYzine (VISTARIL) capsule 50 mg, 50 mg, Oral, TID PRN, Davina Matson MD    haloperidol lactate (HALDOL) injection 5 mg, 5 mg, Intramuscular, Q6H PRN **OR** haloperidol (HALDOL) tablet 5 mg, 5 mg, Oral, Q6H PRN, Davina Matson MD    traZODone (DESYREL) tablet 50 mg, 50 mg, Oral,

## 2020-03-29 PROCEDURE — 6370000000 HC RX 637 (ALT 250 FOR IP): Performed by: PSYCHIATRY & NEUROLOGY

## 2020-03-29 PROCEDURE — 6370000000 HC RX 637 (ALT 250 FOR IP): Performed by: NURSE PRACTITIONER

## 2020-03-29 PROCEDURE — 1240000000 HC EMOTIONAL WELLNESS R&B

## 2020-03-29 PROCEDURE — 99232 SBSQ HOSP IP/OBS MODERATE 35: CPT | Performed by: NURSE PRACTITIONER

## 2020-03-29 RX ORDER — CHLORDIAZEPOXIDE HYDROCHLORIDE 25 MG/1
25 CAPSULE, GELATIN COATED ORAL EVERY 6 HOURS SCHEDULED
Status: DISCONTINUED | OUTPATIENT
Start: 2020-03-29 | End: 2020-03-30

## 2020-03-29 RX ADMIN — DIVALPROEX SODIUM 250 MG: 250 TABLET, DELAYED RELEASE ORAL at 21:25

## 2020-03-29 RX ADMIN — TOPIRAMATE 100 MG: 100 TABLET, FILM COATED ORAL at 09:09

## 2020-03-29 RX ADMIN — CHLORDIAZEPOXIDE HYDROCHLORIDE 25 MG: 25 CAPSULE ORAL at 09:08

## 2020-03-29 RX ADMIN — FOLIC ACID 1 MG: 1 TABLET ORAL at 09:08

## 2020-03-29 RX ADMIN — CHLORDIAZEPOXIDE HYDROCHLORIDE 25 MG: 25 CAPSULE ORAL at 16:15

## 2020-03-29 RX ADMIN — TRAZODONE HYDROCHLORIDE 50 MG: 50 TABLET ORAL at 21:25

## 2020-03-29 RX ADMIN — CHLORDIAZEPOXIDE HYDROCHLORIDE 25 MG: 25 CAPSULE ORAL at 06:44

## 2020-03-29 RX ADMIN — PANTOPRAZOLE SODIUM 40 MG: 40 TABLET, DELAYED RELEASE ORAL at 09:09

## 2020-03-29 RX ADMIN — LISINOPRIL 20 MG: 20 TABLET ORAL at 09:08

## 2020-03-29 RX ADMIN — TOPIRAMATE 100 MG: 100 TABLET, FILM COATED ORAL at 21:25

## 2020-03-29 RX ADMIN — DIVALPROEX SODIUM 250 MG: 250 TABLET, DELAYED RELEASE ORAL at 09:09

## 2020-03-29 RX ADMIN — Medication 100 MG: at 09:09

## 2020-03-29 RX ADMIN — MIRTAZAPINE 15 MG: 15 TABLET, FILM COATED ORAL at 21:25

## 2020-03-29 RX ADMIN — BUPROPION HYDROCHLORIDE 150 MG: 150 TABLET, EXTENDED RELEASE ORAL at 09:08

## 2020-03-29 ASSESSMENT — PAIN SCALES - GENERAL: PAINLEVEL_OUTOF10: 0

## 2020-03-29 NOTE — GROUP NOTE
Patient pleasant and engaged in group, willing to share goal for the day as to try to understand what I can control. Date: 3/29/2020    Group Start Time: 1010  Group End Time: 1100  Group Topic: Psychoeducation    [facility]  [id number] [place], [place]                                                                        Group Therapy Note    Date: 3/29/2020    Wellness Binder Information  Module Name: dimensions of wellness   Session Number: na    Patient's Goal: patient will be able to id different dimensions of wellness and what he/she can commit to during inpt stay. Status After Intervention:  Improved    Participation Level:  Active Listener and Interactive    Participation Quality: Appropriate, Attentive, Sharing, and Supportive      Speech: normal       Thought Process/Content: Logical      Affective Functioning: Congruent      Mood: euthymic      Level of consciousness:  Alert, Oriented x4, and Attentive      Response to Learning: Able to verbalize/acknowledge new learning, Able to retain information, and Progressing to goal      Endings: None Reported    Modes of Intervention: Education, Support, Socialization, Exploration, and Problem-solving      Discipline Responsible: Psychoeducational Specialist      Signature:  Daryl Weiss

## 2020-03-29 NOTE — PLAN OF CARE
Patient was out on unit, social,denies thoughts of harm to self and others,denies hearing voices,seeing shadows, no anxiety. Verbalizes , Depression is 2 out of 10 my life has not turned out the way way I want it to. I live a little depressed but not over whelmed with it. \"  \"I sleep quite well throughout the night. My appetite is to good. \"  After discharge patient states that he plans to get into out patient rehab. Compliant with medications and attends groups. Safety rounds continue.

## 2020-03-29 NOTE — GROUP NOTE
Group Therapy Note    Date: 3/29/2020    Group Start Time: 0115  Group End Time: 0200  Group Topic: Cognitive Skills    [facility]    JO Silva, ALEXW    Number of participants: 10  Type of group: Cognitive Skills  Mode of intervention: Education, Support, Socialization, Exploration, Clarifying, and Problem-solving  Topic: The Miracle Question  Objective: To have pt envision their life with their greatest perceived problem gone. Group Therapy Note         Notes:  Pt was an active participant in cognitive skills group focusing on the miracle question. Pt was able to envision self with the greatest problem in life gone and shared with group members this vision. Pt was also able to provide supportive feedback to group members. Status After Intervention:  Improved    Participation Level:  Active Listener and Interactive    Participation Quality: Appropriate      Speech:  normal      Thought Process/Content: Logical      Affective Functioning: Congruent      Mood: euthymic      Level of consciousness:  Alert, Oriented x4 and Attentive      Response to Learning: Progressing to goal      Endings: None Reported    Modes of Intervention: Education, Support, Socialization, Exploration, Clarifying and Problem-solving      Discipline Responsible: /Counselor      Signature:  JO Silva, [place]

## 2020-03-29 NOTE — PROGRESS NOTES
BEHAVIORAL HEALTH FOLLOW-UP NOTE     3/29/2020     Patient was seen and examined in person, Chart reviewed   Patient's case discussed with staff/team    Chief Complaint: I would like to go to [facility] through the [facility]    Interim History: The patient denies suicidal homicidal ideation. Patient denies audible and visual hallucinations. Patient is taking his medications and going to groups. The patient's recent CWA was a 1. Patient states that he does not have any significant signs or symptoms of alcohol withdrawal.  Patient rates his depression as a 6 which is an improvement. The patient states he is not anxious. Patient is calm and cooperative on the unit. The patient was agreeable to have his Librium decreased today and stated that the Librium is making him way too tired. Appetite:  [x] Normal/Unchanged  [] Increased  [] Decreased      Sleep:       [x] Normal/Unchanged  [] Fair       [] Poor              Energy:    [x] Normal/Unchanged  [] Increased  [] Decreased        SI [] Present  [x] Absent    HI  []Present  [x] Absent     Aggression:  [] yes  [x] no    Patient is [x] able  [] unable to CONTRACT FOR SAFETY     PAST MEDICAL/PSYCHIATRIC HISTORY:   Past Medical History:   Diagnosis Date    Back pain, chronic     Depression     Hypertension        FAMILY/SOCIAL HISTORY:  No family history on file.   Social History     Socioeconomic History    Marital status:      Spouse name: Not on file    Number of children: Not on file    Years of education: Not on file    Highest education level: Not on file   Occupational History    Not on file   Social Needs    Financial resource strain: Not on file    Food insecurity     Worry: Not on file     Inability: Not on file    Transportation needs     Medical: Not on file     Non-medical: Not on file   Tobacco Use    Smoking status: Current Every Day Smoker    Smokeless tobacco: Current User     Types: Snuff   Substance and Sexual Activity    Alcohol use: content: Without hallucinations delusions or paranoia  Cognition:  oriented to person, place, and time   Concentration intact  Insight fair   Judgement fair     ASSESSMENT:   Patient symptoms are:  [] Well controlled  [x] Improving  [] Worsening  [] No change      Diagnosis:  Active Problems:    Depression with suicidal ideation  Resolved Problems:    * No resolved hospital problems. *      LABS:    No results for input(s): WBC, HGB, PLT in the last 72 hours. No results for input(s): NA, K, CL, CO2, BUN, CREATININE, GLUCOSE in the last 72 hours. No results for input(s): BILITOT, ALKPHOS, AST, ALT in the last 72 hours. Lab Results   Component Value Date    [address] POSITIVE 03/26/2020    BARBSCNU POSITIVE 03/26/2020    LABBENZ NOT DETECTED 03/26/2020    LABMETH NOT DETECTED 03/26/2020    OPIATESCREENURINE NOT DETECTED 03/26/2020    PHENCYCLIDINESCREENURINE NOT DETECTED 03/26/2020    ETOH <10 03/26/2020     Lab Results   Component Value Date    TSH 1.640 12/02/2013     No results found for: LITHIUM  No results found for: VALPROATE, CBMZ        Treatment Plan:  Reviewed current Medications with the patient. Wellbutrin  mg daily  Depakote  mg twice daily  Librium 25 mg every 6 hours for alcohol withdrawal  Risks, benefits, side effects, drug-to-drug interactions and alternatives to treatment were discussed. Collateral information: To be obtained by  to insure patient safety on discharge  CD evaluation  Encourage patient to attend group and other milieu activities.   Discharge planning discussed with the patient and treatment team.    PSYCHOTHERAPY/COUNSELING:  [x] Therapeutic interview  [x] Supportive  [] CBT  [] Ongoing  [] Other    [x] Patient continues to need, on a daily basis, active treatment furnished directly by or requiring the supervision of inpatient psychiatric personnel      Anticipated Length of stay: 5 to 7 days            Electronically signed by ROBERT Alonzo CNP on

## 2020-03-30 PROCEDURE — 6370000000 HC RX 637 (ALT 250 FOR IP): Performed by: PSYCHIATRY & NEUROLOGY

## 2020-03-30 PROCEDURE — 6370000000 HC RX 637 (ALT 250 FOR IP): Performed by: NURSE PRACTITIONER

## 2020-03-30 PROCEDURE — 1240000000 HC EMOTIONAL WELLNESS R&B

## 2020-03-30 PROCEDURE — 99232 SBSQ HOSP IP/OBS MODERATE 35: CPT | Performed by: NURSE PRACTITIONER

## 2020-03-30 RX ORDER — CHLORDIAZEPOXIDE HYDROCHLORIDE 25 MG/1
25 CAPSULE, GELATIN COATED ORAL EVERY 8 HOURS PRN
Status: DISCONTINUED | OUTPATIENT
Start: 2020-03-30 | End: 2020-04-01 | Stop reason: HOSPADM

## 2020-03-30 RX ADMIN — TOPIRAMATE 100 MG: 100 TABLET, FILM COATED ORAL at 21:06

## 2020-03-30 RX ADMIN — CHLORDIAZEPOXIDE HYDROCHLORIDE 25 MG: 25 CAPSULE ORAL at 17:24

## 2020-03-30 RX ADMIN — BUPROPION HYDROCHLORIDE 150 MG: 150 TABLET, EXTENDED RELEASE ORAL at 09:05

## 2020-03-30 RX ADMIN — MIRTAZAPINE 15 MG: 15 TABLET, FILM COATED ORAL at 21:06

## 2020-03-30 RX ADMIN — CHLORDIAZEPOXIDE HYDROCHLORIDE 25 MG: 25 CAPSULE ORAL at 06:17

## 2020-03-30 RX ADMIN — LISINOPRIL 20 MG: 20 TABLET ORAL at 09:05

## 2020-03-30 RX ADMIN — Medication 100 MG: at 09:04

## 2020-03-30 RX ADMIN — FOLIC ACID 1 MG: 1 TABLET ORAL at 09:04

## 2020-03-30 RX ADMIN — CHLORDIAZEPOXIDE HYDROCHLORIDE 25 MG: 25 CAPSULE ORAL at 00:08

## 2020-03-30 RX ADMIN — DIVALPROEX SODIUM 250 MG: 250 TABLET, DELAYED RELEASE ORAL at 09:04

## 2020-03-30 RX ADMIN — TRAZODONE HYDROCHLORIDE 50 MG: 50 TABLET ORAL at 21:06

## 2020-03-30 RX ADMIN — DIVALPROEX SODIUM 250 MG: 250 TABLET, DELAYED RELEASE ORAL at 21:06

## 2020-03-30 RX ADMIN — PANTOPRAZOLE SODIUM 40 MG: 40 TABLET, DELAYED RELEASE ORAL at 09:04

## 2020-03-30 RX ADMIN — CHLORDIAZEPOXIDE HYDROCHLORIDE 25 MG: 25 CAPSULE ORAL at 12:49

## 2020-03-30 RX ADMIN — TOPIRAMATE 100 MG: 100 TABLET, FILM COATED ORAL at 09:05

## 2020-03-30 ASSESSMENT — PAIN SCALES - GENERAL
PAINLEVEL_OUTOF10: 0
PAINLEVEL_OUTOF10: 0

## 2020-03-30 NOTE — GROUP NOTE
Group Therapy Note    Date: 3/30/2020    Group Start Time: 1320  Group End Time: 2997  Group Topic: Recovery    [facility] [address], [place], [place]        Group Therapy Note    Attendees: 12       Patient's Goal: Identify and discuss personal strengths and qualities    Notes:  Pt was able to identify strengths and actively participated in group discussion through active listening and minimal sharing. Status After Intervention:  Unchanged    Participation Level:  Active Listener, Interactive and Minimal    Participation Quality: Appropriate and Attentive      Speech:  normal      Thought Process/Content: Logical      Affective Functioning: Congruent      Mood: euthymic      Level of consciousness:  Alert, Oriented x4 and Attentive      Response to Learning: Able to verbalize current knowledge/experience      Endings: None Reported    Modes of Intervention: Support, Socialization and Exploration      Discipline Responsible: /Counselor      Signature:  JO Briones, ITZ

## 2020-03-30 NOTE — PROGRESS NOTES
[facility] FOLLOW-UP NOTE     3/30/2020     Patient was seen and examined in person, Chart reviewed   Patient's case discussed with staff/team    Chief Complaint: I would like to go to [facility] through the [place]    Interim History: The patient denies suicidal homicidal ideations. Patient has audible visual hallucinations. The patient is taking all his medications as prescribed and going to group. The patient's recent CIWA score was 0 and he admits to no withdrawal symptoms. The patient states that he is feeling good and has a had a significant decrease in his depression and rates his depression as a 2 out of 10. He also stated that he feels like he is learning a lot from group and that group is helping him to decrease his depression. The patient stated that he would like to be taken off Librium so I informed the patient that I will make the Librium a as needed order and if he starts to feel withdrawal symptoms to ask for it. The patient stated understanding of what I told him. Appetite:  [x] Normal/Unchanged  [] Increased  [] Decreased      Sleep:       [x] Normal/Unchanged  [] Fair       [] Poor              Energy:    [x] Normal/Unchanged  [] Increased  [] Decreased        SI [] Present  [x] Absent    HI  []Present  [x] Absent     Aggression:  [] yes  [x] no    Patient is [x] able  [] unable to CONTRACT FOR SAFETY     PAST MEDICAL/PSYCHIATRIC HISTORY:   Past Medical History:   Diagnosis Date    Back pain, chronic     Depression     Hypertension        FAMILY/SOCIAL HISTORY:  No family history on file.   Social History     Socioeconomic History    Marital status:      Spouse name: Not on file    Number of children: Not on file    Years of education: Not on file    Highest education level: Not on file   Occupational History    Not on file   Social Needs    Financial resource strain: Not on file    Food insecurity     Worry: Not on file     Inability: Not on file   [employer] needs Medical: Not on file     Non-medical: Not on file   Tobacco Use    Smoking status: Current Every Day Smoker    Smokeless tobacco: Current User     Types: Snuff   Substance and Sexual Activity    Alcohol use: No    Drug use: No    Sexual activity: Not on file   Lifestyle    Physical activity     Days per week: Not on file     Minutes per session: Not on file    Stress: Not on file   Relationships    Social connections     Talks on phone: Not on file     Gets together: Not on file     Attends [employer] service: Not on file     Active member of club or organization: Not on file     Attends meetings of clubs or organizations: Not on file     Relationship status: Not on file    Intimate partner violence     Fear of current or ex partner: Not on file     Emotionally abused: Not on file     Physically abused: Not on file     Forced sexual activity: Not on file   Other Topics Concern    Not on file   Social History Narrative    Not on file           ROS:  [x] All negative/unchanged except if checked.  Explain positive(checked items) below:  [] Constitutional  [] Eyes  [] Ear/Nose/Mouth/Throat  [] Respiratory  [] CV  [] GI  []   [] Musculoskeletal  [] Skin/Breast  [] Neurological  [] Endocrine  [] Heme/Lymph  [] Allergic/Immunologic    Explanation:     MEDICATIONS:    Current Facility-Administered Medications:     chlordiazePOXIDE (LIBRIUM) capsule 25 mg, 25 mg, Oral, Q8H PRN, Tamea Shmuel, APRN - CNP    nicotine (NICODERM CQ) 21 MG/24HR 1 patch, 1 patch, Transdermal, Daily, Shirin Rivera, APRN - CNP, 1 patch at 03/30/20 2080    nicotine polacrilex (COMMIT) lozenge 4 mg, 4 mg, Oral, PRN, Martha Dangelo, APRN - CNP, 4 mg at 03/28/20 2139    mirtazapine (REMERON) tablet 15 mg, 15 mg, Oral, Nightly, Gume Gardner MD, 15 mg at 03/29/20 2125    pantoprazole (PROTONIX) tablet 40 mg, 40 mg, Oral, Daily, Gume Gardner MD, 40 mg at 03/30/20 0904    topiramate (TOPAMAX) tablet 100 mg, 100 mg, Oral,

## 2020-03-30 NOTE — PROGRESS NOTES
[address] [place] [address]  Day 3 Interdisciplinary Treatment Plan NOTE    Review Date & Time: 03/30/2020 0900    Patient was in treatment team    Admission Type:   Admission Type: Involuntary    Reason for admission:  Reason for Admission: \"starting to think I might hurt myself increasing ETOH consumption the last 3wks\"  Estimated Length of Stay Update:  3-5 days  Estimated Discharge Date Update: 5-8 days    PATIENT STRENGTHS:  Patient Strengths Strengths: Communication  Patient Strengths and Limitations:Limitations: Difficulty problem solving/relies on others to help solve problems, Difficult relationships / poor social skills  Addictive Behavior:Addictive Behavior  In the past 3 months, have you felt or has someone told you that you have a problem with:  : None  Do you have a history of Chemical Use?: No  Do you have a history of Alcohol Use?: Yes  Do you have a history of Street Drug Abuse?: No  Histroy of Prescripton Drug Abuse?: No  Medical Problems:  Past Medical History:   Diagnosis Date    Back pain, chronic     Depression     Hypertension        Risk:  Fall RiskTotal: 71  Amor Scale Amor Scale Score: 23  BVC Total: 0  Change in scores no.  Changes to plan of Care none    Status EXAM:   Status and Exam  Normal: No  Facial Expression: Worried, Sad, Flat  Affect: Appropriate  Level of Consciousness: Alert  Mood:Normal: No  Mood: Anxious, Depressed  Motor Activity:Normal: No  Motor Activity: Decreased  Interview Behavior: Cooperative  Preception: [place] to Person, Ericka Yaw to Time, [place] to Place, [place] to Situation  Attention:Normal: No  Attention: Distractible  Thought Processes: (struggles to focus)  Thought Content:Normal: Yes  Hallucinations: None  Delusions: No  Memory:Normal: No  Memory: Poor Recent  Insight and Judgment: No  Insight and Judgment: Poor Judgment, Poor Insight  Present Suicidal Ideation: No  Present Homicidal Ideation: No    Daily Assessment Last Entry:   Daily Sleep (WDL): Within

## 2020-03-31 PROCEDURE — 6370000000 HC RX 637 (ALT 250 FOR IP): Performed by: PSYCHIATRY & NEUROLOGY

## 2020-03-31 PROCEDURE — 1240000000 HC EMOTIONAL WELLNESS R&B

## 2020-03-31 PROCEDURE — 6370000000 HC RX 637 (ALT 250 FOR IP): Performed by: NURSE PRACTITIONER

## 2020-03-31 RX ADMIN — DIVALPROEX SODIUM 250 MG: 250 TABLET, DELAYED RELEASE ORAL at 08:22

## 2020-03-31 RX ADMIN — PANTOPRAZOLE SODIUM 40 MG: 40 TABLET, DELAYED RELEASE ORAL at 08:22

## 2020-03-31 RX ADMIN — TRAZODONE HYDROCHLORIDE 50 MG: 50 TABLET ORAL at 19:57

## 2020-03-31 RX ADMIN — LISINOPRIL 20 MG: 20 TABLET ORAL at 08:22

## 2020-03-31 RX ADMIN — TOPIRAMATE 100 MG: 100 TABLET, FILM COATED ORAL at 19:56

## 2020-03-31 RX ADMIN — Medication 100 MG: at 08:22

## 2020-03-31 RX ADMIN — MIRTAZAPINE 15 MG: 15 TABLET, FILM COATED ORAL at 19:56

## 2020-03-31 RX ADMIN — BUPROPION HYDROCHLORIDE 150 MG: 150 TABLET, EXTENDED RELEASE ORAL at 08:21

## 2020-03-31 RX ADMIN — FOLIC ACID 1 MG: 1 TABLET ORAL at 08:21

## 2020-03-31 RX ADMIN — DIVALPROEX SODIUM 250 MG: 250 TABLET, DELAYED RELEASE ORAL at 19:57

## 2020-03-31 RX ADMIN — TOPIRAMATE 100 MG: 100 TABLET, FILM COATED ORAL at 08:22

## 2020-03-31 ASSESSMENT — PAIN SCALES - GENERAL: PAINLEVEL_OUTOF10: 0

## 2020-03-31 NOTE — PLAN OF CARE
Problem: Suicide risk  Goal: Provide patient with safe environment  Description: Provide patient with safe environment  Outcome: Met This Shift     Problem: Altered Mood, Depressive Behavior:  Goal: Able to verbalize and/or display a decrease in depressive symptoms  Description: Able to verbalize and/or display a decrease in depressive symptoms  Outcome: Ongoing     Problem: Altered Mood, Depressive Behavior:  Goal: Absence of self-harm  Description: Absence of self-harm  Outcome: Ongoing

## 2020-03-31 NOTE — PROGRESS NOTES
Patient attended afternoon meet and greet and recreation activity of music. Patient was 1 of 12. Group was facilitated from 405-450. Pleasant and engaged in group.

## 2020-03-31 NOTE — PLAN OF CARE
Out of room more after dinner. Taking po foods and fluids well. Ambulating with steady gait. Out and showered, self directed with ADL'S. Denies suicidal thoughts or intent to harm himself or others. No voiced delusions. Denies hallucinations.

## 2020-03-31 NOTE — PROGRESS NOTES
Denies suicidal ideation, denies homicidal ideation, and denies hallucinations. seclusive to room out for meals.

## 2020-04-01 VITALS
HEART RATE: 85 BPM | TEMPERATURE: 97.1 F | WEIGHT: 223 LBS | DIASTOLIC BLOOD PRESSURE: 69 MMHG | HEIGHT: 68 IN | OXYGEN SATURATION: 99 % | BODY MASS INDEX: 33.8 KG/M2 | RESPIRATION RATE: 17 BRPM | SYSTOLIC BLOOD PRESSURE: 109 MMHG

## 2020-04-01 PROCEDURE — 6370000000 HC RX 637 (ALT 250 FOR IP): Performed by: NURSE PRACTITIONER

## 2020-04-01 PROCEDURE — 6370000000 HC RX 637 (ALT 250 FOR IP): Performed by: PSYCHIATRY & NEUROLOGY

## 2020-04-01 PROCEDURE — 99239 HOSP IP/OBS DSCHRG MGMT >30: CPT | Performed by: NURSE PRACTITIONER

## 2020-04-01 RX ORDER — BUPROPION HYDROCHLORIDE 150 MG/1
150 TABLET ORAL DAILY
Qty: 30 TABLET | Refills: 0 | Status: SHIPPED | OUTPATIENT
Start: 2020-04-02 | End: 2020-05-02

## 2020-04-01 RX ORDER — DIVALPROEX SODIUM 250 MG/1
250 TABLET, DELAYED RELEASE ORAL 2 TIMES DAILY
Qty: 60 TABLET | Refills: 0 | Status: SHIPPED | OUTPATIENT
Start: 2020-04-01 | End: 2020-05-01

## 2020-04-01 RX ORDER — TOPIRAMATE 100 MG/1
100 TABLET, FILM COATED ORAL 2 TIMES DAILY
Qty: 60 TABLET | Refills: 0 | Status: SHIPPED | OUTPATIENT
Start: 2020-04-01 | End: 2020-05-01

## 2020-04-01 RX ADMIN — DIVALPROEX SODIUM 250 MG: 250 TABLET, DELAYED RELEASE ORAL at 08:44

## 2020-04-01 RX ADMIN — Medication 100 MG: at 08:44

## 2020-04-01 RX ADMIN — BUPROPION HYDROCHLORIDE 150 MG: 150 TABLET, EXTENDED RELEASE ORAL at 08:44

## 2020-04-01 RX ADMIN — TOPIRAMATE 100 MG: 100 TABLET, FILM COATED ORAL at 08:44

## 2020-04-01 RX ADMIN — PANTOPRAZOLE SODIUM 40 MG: 40 TABLET, DELAYED RELEASE ORAL at 08:44

## 2020-04-01 RX ADMIN — LISINOPRIL 20 MG: 20 TABLET ORAL at 08:44

## 2020-04-01 RX ADMIN — FOLIC ACID 1 MG: 1 TABLET ORAL at 08:44

## 2020-04-01 ASSESSMENT — PAIN SCALES - GENERAL
PAINLEVEL_OUTOF10: 0
PAINLEVEL_OUTOF10: 0

## 2020-04-01 NOTE — PROGRESS NOTES
[facility]  Discharge Note    Pt discharged with followings belongings:   Dentures: None  Vision - Corrective Lenses: Glasses  Hearing Aid: None  Jewelry: None  Body Piercings Removed: N/A  Clothing: Jacket / coat, Pants, Shirt, Footwear  Were All Patient Medications Collected?: Not Applicable  Other Valuables: Cell phone   Valuables sent home with pt. Valuables retrieved from locker and returned to patient. Patient education on aftercare instructions: given, along with printed prescriptions and suicide crisis management plan. Information faxed to [facility] by . Patient verbalize understanding of AVS:   Yes with stated potential to comply to same.  .    Status EXAM upon discharge:  Status and Exam  Normal: Yes  Facial Expression: Brightened  Affect: Appropriate  Level of Consciousness: Alert  Mood:Normal: Yes  Mood: pleasant  Motor Activity: Decreased,slow  Interview Behavior: Cooperative  Preception: [place] to Person, Samantha Running to Time, [place] to Place, [place] to Situation  Attention:Normal: Yes  Thought Processes: more organized  Thought Content:Normal: Yes  Hallucinations: None  Delusions: No  Memory:Normal: Yes  Insight and Judgment: Yes  Present Suicidal Ideation: No  Present Homicidal Ideation: No      Metabolic Screening:    No results found for: LABA1C    No results found for: CHOL  No results found for: TRIG  No results found for: HDL  No components found for: LDLCAL  No results found for: John Virgen RN

## 2020-04-02 NOTE — DISCHARGE SUMMARY
toward examiner:  attentive and good eye contact  Speech:  spontaneous, normal rate and normal volume   Mood: euthymic  Affect:  mood congruent  Thought processes:  linear, goal directed and coherent   Thought content: Without delusions hallucinations or paranoia  Cognition:  oriented to person, place, and time   Concentration intact  Memory intact  Insight good   Judgement fair   Fund of Knowledge adequate      ASSESSMENT:  Patient symptoms are:  [x] Well controlled  [x] Improving  [] Worsening  [] No change      Diagnosis:  Active Problems:    Depression with suicidal ideation  Resolved Problems:    * No resolved hospital problems. *      LABS:    No results for input(s): WBC, HGB, PLT in the last 72 hours. No results for input(s): NA, K, CL, CO2, BUN, CREATININE, GLUCOSE in the last 72 hours. No results for input(s): BILITOT, ALKPHOS, AST, ALT in the last 72 hours. Lab Results   Component Value Date    [facility] POSITIVE 03/26/2020    BARBSCNU POSITIVE 03/26/2020    LABBENZ NOT DETECTED 03/26/2020    LABMETH NOT DETECTED 03/26/2020    OPIATESCREENURINE NOT DETECTED 03/26/2020    PHENCYCLIDINESCREENURINE NOT DETECTED 03/26/2020    ETOH <10 03/26/2020     Lab Results   Component Value Date    TSH 1.640 12/02/2013     No results found for: LITHIUM  No results found for: VALPROATE, CBMZ    RISK ASSESSMENT AT DISCHARGE: Low risk for suicide and homicide. Treatment Plan:  Reviewed current Medications with the patient. Education provided on the complaince with treatment. Risks, benefits, side effects, drug-to-drug interactions and alternatives to treatment were discussed. Encourage patient to attend outpatient follow up appointment and therapy. Patient was advised to call the outpatient provider, visit the nearest ED or call 911 if symptoms are not manageable. Patient's family member was contacted prior to the discharge.          Medication List      START taking these medications    buPROPion 150 MG